# Patient Record
Sex: MALE | Race: WHITE | Employment: UNEMPLOYED | URBAN - NONMETROPOLITAN AREA
[De-identification: names, ages, dates, MRNs, and addresses within clinical notes are randomized per-mention and may not be internally consistent; named-entity substitution may affect disease eponyms.]

---
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
## 2022-07-05 ENCOUNTER — OFFICE VISIT (OUTPATIENT)
Dept: FAMILY MEDICINE CLINIC | Age: 22
End: 2022-07-05
Payer: COMMERCIAL

## 2022-07-05 VITALS
SYSTOLIC BLOOD PRESSURE: 122 MMHG | OXYGEN SATURATION: 98 % | WEIGHT: 183.8 LBS | HEART RATE: 79 BPM | HEIGHT: 72 IN | DIASTOLIC BLOOD PRESSURE: 78 MMHG | BODY MASS INDEX: 24.89 KG/M2

## 2022-07-05 DIAGNOSIS — Z76.89 ENCOUNTER TO ESTABLISH CARE: Primary | ICD-10-CM

## 2022-07-05 DIAGNOSIS — F41.8 MIXED ANXIETY AND DEPRESSIVE DISORDER: ICD-10-CM

## 2022-07-05 LAB
ANION GAP SERPL CALCULATED.3IONS-SCNC: 7.4 MMOL/L
BASOPHILS %: 1.03 (ref 0–3)
BASOPHILS ABSOLUTE: 0.07 (ref 0–0.3)
BUN BLDV-MCNC: 17 MG/DL (ref 9–20)
CALCIUM SERPL-MCNC: 9.3 MG/DL (ref 8.4–10.2)
CHLORIDE BLD-SCNC: 106 MMOL/L (ref 98–120)
CO2: 25 MMOL/L (ref 22–31)
CREAT SERPL-MCNC: 0.8 MG/DL (ref 0.7–1.3)
EOSINOPHILS %: 2.6 (ref 0–10)
EOSINOPHILS ABSOLUTE: 0.17 (ref 0–1.1)
GFR CALCULATED: > 60
GLUCOSE: 65 MG/DL (ref 75–110)
HCT VFR BLD CALC: 46.6 % (ref 42–52)
HEMOGLOBIN: 15 (ref 13.8–17.8)
LYMPHOCYTE %: 30.22 (ref 20–51.1)
LYMPHOCYTES ABSOLUTE: 2.01 (ref 1–5.5)
MCH RBC QN AUTO: 28 PG (ref 28.5–32.5)
MCHC RBC AUTO-ENTMCNC: 32.2 G/DL (ref 32–37)
MCV RBC AUTO: 86.9 FL (ref 80–94)
MONOCYTES %: 9.34 (ref 1.7–9.3)
MONOCYTES ABSOLUTE: 0.62 (ref 0.1–1)
NEUTROPHILS %: 56.81 (ref 42.2–75.2)
NEUTROPHILS ABSOLUTE: 3.77 (ref 2–8.1)
PDW BLD-RTO: 10.9 % (ref 10–15.5)
PLATELET # BLD: 178.3 THOU/MM3 (ref 130–400)
POTASSIUM SERPL-SCNC: 4.3 MMOL/L (ref 3.6–5)
RBC: 5.37 M/UL (ref 4.7–6.1)
SODIUM BLD-SCNC: 139 MMOL/L (ref 135–145)
TSH REFLEX FT4: 2.06 MIU/ML (ref 0.49–4.67)
WBC: 6.6 THOU/ML3 (ref 4.8–10.8)

## 2022-07-05 PROCEDURE — 99204 OFFICE O/P NEW MOD 45 MIN: CPT | Performed by: NURSE PRACTITIONER

## 2022-07-05 ASSESSMENT — ENCOUNTER SYMPTOMS
SHORTNESS OF BREATH: 0
CONSTIPATION: 0
COUGH: 0
ABDOMINAL PAIN: 0
DIARRHEA: 0
WHEEZING: 0
EYES NEGATIVE: 1
NAUSEA: 1
VOMITING: 1

## 2022-07-05 ASSESSMENT — PATIENT HEALTH QUESTIONNAIRE - PHQ9
2. FEELING DOWN, DEPRESSED OR HOPELESS: 1
1. LITTLE INTEREST OR PLEASURE IN DOING THINGS: 1
SUM OF ALL RESPONSES TO PHQ9 QUESTIONS 1 & 2: 2
SUM OF ALL RESPONSES TO PHQ QUESTIONS 1-9: 2

## 2022-07-05 NOTE — PROGRESS NOTES
[address] [facility]  [address]  Dept: [phone]  Dept Fax: [phone]    Patient:  Marin Fang  YOB: 2000  Date of Service:  2022    Subjective:   Marin Fang (:  2000) is a 22 y.o. male, New patient, here for evaluation of the following chief complaint(s):    Chief Complaint   Patient presents with    New Patient     here to est c/o panic attacks, sleep disturbance, any social gathering gets worked up and sometimes will vomit, feels like walls are closing in and can't breath, has been dealing with issues for about a year       Patient here to establish care. He is concerned for increased anxiety, sometimes depression. He works at [employer] in [email or web address]. Mental Health Problem  The primary symptoms include dysphoric mood (occasional). The current episode started more than 1 month ago. This is a chronic problem. The degree of incapacity that he is experiencing as a consequence of his illness is mild. Additional symptoms of the illness include insomnia, appetite change (decreased) and agitation. Additional symptoms of the illness do not include fatigue, headaches or abdominal pain. He does not admit to suicidal ideas. He has not already injured self. He does not contemplate injuring another person. Risk factors that are present for mental illness include a history of mental illness and a family history of mental illness. The ASCVD Risk score (Nadia Ryan et al., 2013) failed to calculate for the following reasons:     The 2013 ASCVD risk score is only valid for ages 36 to 78     BP Readings from Last 3 Encounters:   22 122/78   05/10/19 130/70 (85 %, Z = 1.04 /  54 %, Z = 0.10)*   16 110/68 (41 %, Z = -0.23 /  58 %, Z = 0.20)*     *BP percentiles are based on the 2017 AAP Clinical Practice Guideline for boys      Pulse Readings from Last 3 Encounters:   22 79   05/10/19 74 12/28/16 79      Wt Readings from Last 3 Encounters:   07/05/22 183 lb 12.8 oz (83.4 kg)   05/10/19 189 lb 12.8 oz (86.1 kg) (91 %, Z= 1.37)*   12/28/16 150 lb 6 oz (68.2 kg) (79 %, Z= 0.82)*     * Growth percentiles are based on [facility] (Boys, 2-20 Years) data. Preventive Care:     Health Maintenance   Topic Date Due    Varicella vaccine (1 of 2 - 2-dose childhood series) Never done    COVID-19 Vaccine (1) Never done    HPV vaccine (1 - Male 2-dose series) Never done    Depression Monitoring  Never done    HIV screen  Never done    Hepatitis C screen  Never done    DTaP/Tdap/Td vaccine (1 - Tdap) Never done    Flu vaccine (1) 09/01/2022    Hepatitis A vaccine  Aged Out    Hepatitis B vaccine  Aged Out    Hib vaccine  Aged Out    Meningococcal (ACWY) vaccine  Aged Out    Pneumococcal 0-64 years Vaccine  Aged Out        Eye exam in past 6-12 months: No  Dental exam in past 6-12 months: Yes  Exercise:  Yes  Seatbelt use:  Yes  Lipid panel:    No results found for: CHOL, TRIG, HDL, LDLCALC, LDLDIRECT    Allergies   Allergen Reactions    Penicillins        Current Outpatient Medications   Medication Sig Dispense Refill    sertraline (ZOLOFT) 50 MG tablet Take 1 tablet by mouth daily 30 tablet 2     No current facility-administered medications for this visit. History reviewed. No pertinent past medical history. History reviewed. No pertinent surgical history. Family History   Problem Relation Age of Onset   [facility] Mental Illness Mother     Mental Illness Father     Mental Illness Sister        Social History     Tobacco Use    Smoking status: Never Smoker    Smokeless tobacco: Never Used   Substance Use Topics    Alcohol use: Not on file    Drug use: Not on file       Review of Systems:     Review of Systems   Constitutional: Positive for appetite change (decreased). Negative for chills, fatigue and fever. HENT: Negative. Eyes: Negative.     Respiratory: Negative for cough, shortness There is no abdominal tenderness. Musculoskeletal:      Cervical back: Neck supple. Right lower leg: No edema. Left lower leg: No edema. Lymphadenopathy:      Cervical: No cervical adenopathy. Skin:     Capillary Refill: Capillary refill takes less than 2 seconds. Neurological:      Mental Status: He is alert and oriented to person, place, and time. Gait: Gait normal.   Psychiatric:         Mood and Affect: Mood is anxious. Behavior: Behavior is cooperative. Assessment/Plan:   1. Encounter to establish care  2. Mixed anxiety and depressive disorder  -     sertraline (ZOLOFT) 50 MG tablet; Take 1 tablet by mouth daily, Disp-30 tablet, R-2Normal  -     TSH with Reflex; Future  -     CBC with Auto Differential; Future  -     Basic Metabolic Panel; Future     Start sertraline 50 mg daily. Discussed intended benefits and potential side effects of medication. Encouraged healthy diet and routine exercise. Instructed to continue current medications. All patient questions answered. Return in about 4 weeks (around 8/2/2022) for Anxiety, Depression. Please note that this chart was generated using voice recognition Dragon dictation software. Although every effort was made to ensure the accuracy of this automated transcription, some errors in transcription may have occurred.     Electronically signed by JOSE Sauceda CNP on 7/5/2022 at 8:48 AM.

## 2022-07-05 NOTE — PATIENT INSTRUCTIONS
Thank you for enrolling in [address] Please follow the instructions below to securely access your online medical record. [employer] allows you to send messages to your doctor, view your test results, renew your prescriptions, schedule appointments, and more. How Do I Sign Up? 1. In your Internet browser, go to [email or web address]  2. Click on the Sign Up Now link in the Sign In box. You will see the New Member Sign Up page. 3. Enter your [employer] Access Code exactly as it appears below. You will not need to use this code after youve completed the sign-up process. If you do not sign up before the expiration date, you must request a new code. [employer] Access Code: [email or web address]  Expires: 8/19/2022  8:28 AM    4. Enter your Social Security Number ([id number]) and Date of Birth (mm/dd/yyyy) as indicated and click Submit. You will be taken to the next sign-up page. 5. Create a [employer] ID. This will be your [employer] login ID and cannot be changed, so think of one that is secure and easy to remember. 6. Create a [employer] password. You can change your password at any time. 7. Enter your Password Reset Question and Answer. This can be used at a later time if you forget your password. 8. Enter your e-mail address. You will receive e-mail notification when new information is available in [address] 9. Click Sign Up. You can now view your medical record. Additional Information  If you have questions, please contact your physician practice where you receive care. Remember, [employer] is NOT to be used for urgent needs. For medical emergencies, dial 911.

## 2022-07-18 DIAGNOSIS — F41.8 MIXED ANXIETY AND DEPRESSIVE DISORDER: ICD-10-CM

## 2022-08-08 ENCOUNTER — OFFICE VISIT (OUTPATIENT)
Dept: FAMILY MEDICINE CLINIC | Age: 22
End: 2022-08-08
Payer: COMMERCIAL

## 2022-08-08 VITALS
WEIGHT: 174.4 LBS | SYSTOLIC BLOOD PRESSURE: 138 MMHG | HEART RATE: 82 BPM | OXYGEN SATURATION: 98 % | DIASTOLIC BLOOD PRESSURE: 74 MMHG | BODY MASS INDEX: 23.37 KG/M2

## 2022-08-08 DIAGNOSIS — F41.8 MIXED ANXIETY AND DEPRESSIVE DISORDER: Primary | ICD-10-CM

## 2022-08-08 DIAGNOSIS — G47.9 SLEEP DISTURBANCE: ICD-10-CM

## 2022-08-08 PROCEDURE — 99213 OFFICE O/P EST LOW 20 MIN: CPT | Performed by: NURSE PRACTITIONER

## 2022-08-08 RX ORDER — BUSPIRONE HYDROCHLORIDE 10 MG/1
TABLET ORAL
Qty: 60 TABLET | Refills: 0 | Status: SHIPPED | OUTPATIENT
Start: 2022-08-08

## 2022-08-08 RX ORDER — FLUOXETINE HYDROCHLORIDE 20 MG/1
20 CAPSULE ORAL DAILY
Qty: 30 CAPSULE | Refills: 2 | Status: SHIPPED | OUTPATIENT
Start: 2022-08-08

## 2022-08-08 NOTE — PROGRESS NOTES
[address] [address] [address]  Dept: [phone]  Dept Fax: [phone]    History and Physical  Patient:  Santosh Marquez  YOB: 2000  Date of Service:  2022    Subjective:   Santosh Marquez (:  2000) is a 25 y.o. male, Established patient, here for evaluation of the following chief complaint(s):    Chief Complaint   Patient presents with    Anxiety     Pt reports stopped his zoloft due to nausea. Mom is a nurse and advised to take for two weeks to get in system did this but nausea did not improve. Trouble falling asleep. No trouble staying asleep. Appetite has improved since stopping meds. Depression      Started the Zoloft 4 weeks ago and states he felt nauseous which did not improve. Also has a loss in appetite. He did not notice any improvement with the anxiety or depression. He would like to discuss other medication options. Mental Health Problem  The primary symptoms include depression and anxiety. The current episode started several months ago. This is a chronic problem. The degree of incapacity that he is experiencing as a consequence of his illness is mild. Additional symptoms of the illness include insomnia, appetite change (decreased) and agitation. Additional symptoms of the illness do not include fatigue, headaches or abdominal pain. He does not admit to suicidal ideas. He has not already injured self. He does not contemplate injuring another person. Risk factors that are present for mental illness include a history of mental illness and a family history of mental illness. The ASCVD Risk score (Eliud Rizvi., et al., 2013) failed to calculate for the following reasons:     The 2013 ASCVD risk score is only valid for ages 36 to 78     BP Readings from Last 3 Encounters:   22 138/74   22 122/78   05/10/19 130/70 (85 %, Z = 1.04 /  54 %, Z = 0.10)*     *BP percentiles are based on the 2017 AAP Clinical Practice Guideline for boys      Pulse Readings from Last 3 Encounters:   08/08/22 82   07/05/22 79   05/10/19 74      Wt Readings from Last 3 Encounters:   08/08/22 174 lb 6.4 oz (79.1 kg)   07/05/22 183 lb 12.8 oz (83.4 kg)   05/10/19 189 lb 12.8 oz (86.1 kg) (91 %, Z= 1.37)*     * Growth percentiles are based on [facility] (Boys, 2-20 Years) data. Allergies   Allergen Reactions    Zoloft [Sertraline] Other (See Comments)     Nausea and weight loss    Penicillins        Current Outpatient Medications   Medication Sig Dispense Refill    FLUoxetine (PROZAC) 20 MG capsule Take 1 capsule by mouth in the morning. 30 capsule 2    busPIRone (BUSPAR) 10 MG tablet Take 1-2 tablets by mouth as needed for anxiety and sleep. 60 tablet 0     No current facility-administered medications for this visit. History reviewed. No pertinent past medical history. No past surgical history on file. Family History   Problem Relation Age of Onset    Mental Illness Mother     Mental Illness Father     Mental Illness Sister      Social History     Tobacco Use    Smoking status: Never    Smokeless tobacco: Never       Review of Systems:     Review of Systems   Constitutional:  Negative for appetite change, chills, fatigue and fever. HENT: Negative. Eyes: Negative. Respiratory:  Negative for cough, shortness of breath and wheezing. Cardiovascular:  Negative for chest pain, palpitations and leg swelling. Gastrointestinal:  Negative for abdominal pain, constipation and diarrhea. Genitourinary: Negative. Skin: Negative. Allergic/Immunologic: Negative for environmental allergies and food allergies. Neurological:  Negative for dizziness, light-headedness and headaches. Psychiatric/Behavioral:  Positive for dysphoric mood and sleep disturbance. Negative for agitation, self-injury and suicidal ideas. The patient is nervous/anxious.       PHQ Scores 7/5/2022   PHQ2 Score 2   PHQ9 Score 2 Interpretation of Total Score Depression Severity: 1-4 = Minimal depression, 5-9 = Mild depression, 10-14 = Moderate depression, 15-19 = Moderately severe depression, 20-27 = Severe depression     Physical Exam:     Vitals:    08/08/22 1447   BP: 138/74   Site: Left Upper Arm   Position: Sitting   Cuff Size: Medium Adult   Pulse: 82   SpO2: 98%   Weight: 174 lb 6.4 oz (79.1 kg)      Body mass index is 23.37 kg/m². Physical Exam  Constitutional:       Appearance: Normal appearance. He is well-developed and well-groomed. HENT:      Head: Normocephalic. Eyes:      Conjunctiva/sclera: Conjunctivae normal.   Neck:      Thyroid: No thyromegaly. Vascular: No carotid bruit. Cardiovascular:      Rate and Rhythm: Normal rate and regular rhythm. Heart sounds: Normal heart sounds. Pulmonary:      Effort: Pulmonary effort is normal.      Breath sounds: Normal breath sounds. No wheezing. Musculoskeletal:      Cervical back: Neck supple. Right lower leg: No edema. Left lower leg: No edema. Lymphadenopathy:      Cervical: No cervical adenopathy. Skin:     Capillary Refill: Capillary refill takes less than 2 seconds. Neurological:      Mental Status: He is alert and oriented to person, place, and time. Gait: Gait normal.   Psychiatric:         Mood and Affect: Mood is anxious. Behavior: Behavior is cooperative. Assessment/Plan:   1. Mixed anxiety and depressive disorder  -     FLUoxetine (PROZAC) 20 MG capsule; Take 1 capsule by mouth in the morning., Disp-30 capsule, R-2Normal  -     busPIRone (BUSPAR) 10 MG tablet; Take 1-2 tablets by mouth as needed for anxiety and sleep., Disp-60 tablet, R-0Normal  2. Sleep disturbance  -     busPIRone (BUSPAR) 10 MG tablet;  Take 1-2 tablets by mouth as needed for anxiety and sleep., Disp-60 tablet, R-0Normal     Medications Discontinued During This Encounter   Medication Reason    sertraline (ZOLOFT) 50 MG tablet Side effects

## 2022-08-14 ASSESSMENT — ENCOUNTER SYMPTOMS
SHORTNESS OF BREATH: 0
WHEEZING: 0
EYES NEGATIVE: 1
CONSTIPATION: 0
ABDOMINAL PAIN: 0
COUGH: 0
DIARRHEA: 0

## 2022-12-06 ENCOUNTER — TELEPHONE (OUTPATIENT)
Dept: FAMILY MEDICINE CLINIC | Age: 22
End: 2022-12-06

## 2022-12-06 DIAGNOSIS — F41.8 MIXED ANXIETY AND DEPRESSIVE DISORDER: ICD-10-CM

## 2022-12-07 RX ORDER — FLUOXETINE HYDROCHLORIDE 20 MG/1
20 CAPSULE ORAL DAILY
Qty: 30 CAPSULE | Refills: 5 | Status: SHIPPED | OUTPATIENT
Start: 2022-12-07

## 2022-12-07 NOTE — TELEPHONE ENCOUNTER
Frederic Farrar is requesting a refill on the following medication(s):  Requested Prescriptions     Pending Prescriptions Disp Refills    FLUoxetine (PROZAC) 20 MG capsule 30 capsule 2     Sig: Take 1 capsule by mouth daily       Last Visit Date (If Applicable):  7/5/3552    Next Visit Date:    Visit date not found

## 2023-03-15 DIAGNOSIS — F41.8 MIXED ANXIETY AND DEPRESSIVE DISORDER: ICD-10-CM

## 2023-03-15 DIAGNOSIS — G47.9 SLEEP DISTURBANCE: ICD-10-CM

## 2023-03-15 RX ORDER — BUSPIRONE HYDROCHLORIDE 10 MG/1
TABLET ORAL
Qty: 60 TABLET | Refills: 0 | Status: SHIPPED | OUTPATIENT
Start: 2023-03-15

## 2023-03-15 NOTE — TELEPHONE ENCOUNTER
Janice Reina is calling to request a refill on the following medication(s):  Requested Prescriptions     Pending Prescriptions Disp Refills    busPIRone (BUSPAR) 10 MG tablet 60 tablet 0     Sig: Take 1-2 tablets by mouth as needed for anxiety and sleep.        Last Visit Date (If Applicable):  7/3/6076    Next Visit Date:    Visit date not found

## 2023-11-20 ENCOUNTER — OFFICE VISIT (OUTPATIENT)
Dept: FAMILY MEDICINE CLINIC | Age: 23
End: 2023-11-20
Payer: COMMERCIAL

## 2023-11-20 VITALS
BODY MASS INDEX: 29.88 KG/M2 | OXYGEN SATURATION: 98 % | WEIGHT: 223 LBS | HEART RATE: 80 BPM | DIASTOLIC BLOOD PRESSURE: 72 MMHG | SYSTOLIC BLOOD PRESSURE: 124 MMHG

## 2023-11-20 DIAGNOSIS — K21.9 GASTROESOPHAGEAL REFLUX DISEASE, UNSPECIFIED WHETHER ESOPHAGITIS PRESENT: ICD-10-CM

## 2023-11-20 DIAGNOSIS — F41.8 MIXED ANXIETY AND DEPRESSIVE DISORDER: ICD-10-CM

## 2023-11-20 DIAGNOSIS — Z00.00 ENCOUNTER FOR WELLNESS EXAMINATION IN ADULT: Primary | ICD-10-CM

## 2023-11-20 DIAGNOSIS — Z23 NEED FOR PROPHYLACTIC VACCINATION AND INOCULATION AGAINST INFLUENZA: ICD-10-CM

## 2023-11-20 PROCEDURE — 99395 PREV VISIT EST AGE 18-39: CPT | Performed by: NURSE PRACTITIONER

## 2023-11-20 PROCEDURE — 90674 CCIIV4 VAC NO PRSV 0.5 ML IM: CPT | Performed by: NURSE PRACTITIONER

## 2023-11-20 PROCEDURE — 90471 IMMUNIZATION ADMIN: CPT | Performed by: NURSE PRACTITIONER

## 2023-11-20 RX ORDER — FLUOXETINE HYDROCHLORIDE 20 MG/1
20 CAPSULE ORAL DAILY
Qty: 30 CAPSULE | Refills: 5 | Status: SHIPPED | OUTPATIENT
Start: 2023-11-20

## 2023-11-20 RX ORDER — OMEPRAZOLE 20 MG/1
20 CAPSULE, DELAYED RELEASE ORAL
Qty: 30 CAPSULE | Refills: 5 | Status: SHIPPED | OUTPATIENT
Start: 2023-11-20

## 2023-11-20 ASSESSMENT — PATIENT HEALTH QUESTIONNAIRE - PHQ9
9. THOUGHTS THAT YOU WOULD BE BETTER OFF DEAD, OR OF HURTING YOURSELF: 0
SUM OF ALL RESPONSES TO PHQ QUESTIONS 1-9: 3
4. FEELING TIRED OR HAVING LITTLE ENERGY: 1
6. FEELING BAD ABOUT YOURSELF - OR THAT YOU ARE A FAILURE OR HAVE LET YOURSELF OR YOUR FAMILY DOWN: 0
8. MOVING OR SPEAKING SO SLOWLY THAT OTHER PEOPLE COULD HAVE NOTICED. OR THE OPPOSITE, BEING SO FIGETY OR RESTLESS THAT YOU HAVE BEEN MOVING AROUND A LOT MORE THAN USUAL: 0
SUM OF ALL RESPONSES TO PHQ9 QUESTIONS 1 & 2: 0
7. TROUBLE CONCENTRATING ON THINGS, SUCH AS READING THE NEWSPAPER OR WATCHING TELEVISION: 0
1. LITTLE INTEREST OR PLEASURE IN DOING THINGS: 0
3. TROUBLE FALLING OR STAYING ASLEEP: 2
5. POOR APPETITE OR OVEREATING: 0
10. IF YOU CHECKED OFF ANY PROBLEMS, HOW DIFFICULT HAVE THESE PROBLEMS MADE IT FOR YOU TO DO YOUR WORK, TAKE CARE OF THINGS AT HOME, OR GET ALONG WITH OTHER PEOPLE: 0
SUM OF ALL RESPONSES TO PHQ QUESTIONS 1-9: 3
2. FEELING DOWN, DEPRESSED OR HOPELESS: 0
SUM OF ALL RESPONSES TO PHQ QUESTIONS 1-9: 3
SUM OF ALL RESPONSES TO PHQ QUESTIONS 1-9: 3

## 2023-11-20 ASSESSMENT — COLUMBIA-SUICIDE SEVERITY RATING SCALE - C-SSRS
4. HAVE YOU HAD THESE THOUGHTS AND HAD SOME INTENTION OF ACTING ON THEM?: NO
3. HAVE YOU BEEN THINKING ABOUT HOW YOU MIGHT KILL YOURSELF?: NO
7. DID THIS OCCUR IN THE LAST THREE MONTHS: NO
5. HAVE YOU STARTED TO WORK OUT OR WORKED OUT THE DETAILS OF HOW TO KILL YOURSELF? DO YOU INTEND TO CARRY OUT THIS PLAN?: NO

## 2023-11-26 ASSESSMENT — ENCOUNTER SYMPTOMS
CONSTIPATION: 0
WHEEZING: 0
EYES NEGATIVE: 1
COUGH: 0
SHORTNESS OF BREATH: 0
ABDOMINAL PAIN: 1
SORE THROAT: 1
NAUSEA: 0
DIARRHEA: 0

## 2024-04-11 ENCOUNTER — TELEPHONE (OUTPATIENT)
Dept: FAMILY MEDICINE CLINIC | Age: 24
End: 2024-04-11

## 2024-04-11 DIAGNOSIS — K21.9 GASTROESOPHAGEAL REFLUX DISEASE, UNSPECIFIED WHETHER ESOPHAGITIS PRESENT: ICD-10-CM

## 2024-04-11 DIAGNOSIS — F41.8 MIXED ANXIETY AND DEPRESSIVE DISORDER: ICD-10-CM

## 2024-04-11 NOTE — TELEPHONE ENCOUNTER
Danny Bowman is requesting a refill on the following medication(s):  Requested Prescriptions     Pending Prescriptions Disp Refills    FLUoxetine (PROZAC) 20 MG capsule 90 capsule 3     Sig: Take 1 capsule by mouth daily    omeprazole (PRILOSEC) 20 MG delayed release capsule 90 capsule 3     Sig: Take 1 capsule by mouth every morning (before breakfast)       Last Visit Date (If Applicable):  11/20/2023    Next Visit Date:    Visit date not found

## 2024-04-12 RX ORDER — FLUOXETINE HYDROCHLORIDE 20 MG/1
20 CAPSULE ORAL DAILY
Qty: 90 CAPSULE | Refills: 0 | Status: SHIPPED | OUTPATIENT
Start: 2024-04-12

## 2024-04-12 RX ORDER — OMEPRAZOLE 20 MG/1
20 CAPSULE, DELAYED RELEASE ORAL
Qty: 90 CAPSULE | Refills: 0 | Status: SHIPPED | OUTPATIENT
Start: 2024-04-12

## 2024-04-16 DIAGNOSIS — F41.8 MIXED ANXIETY AND DEPRESSIVE DISORDER: ICD-10-CM

## 2024-04-16 RX ORDER — FLUOXETINE HYDROCHLORIDE 20 MG/1
20 CAPSULE ORAL DAILY
Qty: 30 CAPSULE | Refills: 0 | Status: SHIPPED | OUTPATIENT
Start: 2024-04-16

## 2024-04-16 NOTE — TELEPHONE ENCOUNTER
Pt states [facility] is telling pt there is a delay in shipping his fluoxetine due to a possible interaction. Pt is out of medication and would like 30 days sent to marcella Bowman is requesting a refill on the following medication(s):  Requested Prescriptions     Pending Prescriptions Disp Refills    FLUoxetine (PROZAC) 20 MG capsule 30 capsule 0     Sig: Take 1 capsule by mouth daily       Last Visit Date (If Applicable):  11/20/2023    Next Visit Date:    Visit date not found

## 2024-06-19 DIAGNOSIS — F41.8 MIXED ANXIETY AND DEPRESSIVE DISORDER: ICD-10-CM

## 2024-06-19 DIAGNOSIS — G47.9 SLEEP DISTURBANCE: ICD-10-CM

## 2024-06-20 NOTE — TELEPHONE ENCOUNTER
Danny Bowman is requesting a refill on the following medication(s):  Requested Prescriptions     Pending Prescriptions Disp Refills    busPIRone (BUSPAR) 10 MG tablet 60 tablet 0     Sig: Take 1-2 tablets by mouth as needed for anxiety and sleep.       Last Visit Date (If Applicable):  11/20/2023    Next Visit Date:    Visit date not found

## 2024-06-21 RX ORDER — BUSPIRONE HYDROCHLORIDE 10 MG/1
TABLET ORAL
Qty: 60 TABLET | Refills: 3 | Status: SHIPPED | OUTPATIENT
Start: 2024-06-21

## 2024-08-09 DIAGNOSIS — F41.8 MIXED ANXIETY AND DEPRESSIVE DISORDER: ICD-10-CM

## 2024-08-09 DIAGNOSIS — K21.9 GASTROESOPHAGEAL REFLUX DISEASE, UNSPECIFIED WHETHER ESOPHAGITIS PRESENT: ICD-10-CM

## 2024-08-12 RX ORDER — OMEPRAZOLE 20 MG/1
20 CAPSULE, DELAYED RELEASE ORAL
Qty: 90 CAPSULE | Refills: 0 | Status: SHIPPED | OUTPATIENT
Start: 2024-08-12

## 2024-08-12 RX ORDER — FLUOXETINE HYDROCHLORIDE 20 MG/1
20 CAPSULE ORAL DAILY
Qty: 90 CAPSULE | Refills: 0 | Status: SHIPPED | OUTPATIENT
Start: 2024-08-12

## 2024-08-12 NOTE — TELEPHONE ENCOUNTER
Danny Bowman is calling to request a refill on the following medication(s):  Requested Prescriptions     Pending Prescriptions Disp Refills    FLUoxetine (PROZAC) 20 MG capsule [Pharmacy Med Name: FLUOXETINE HCL CAPS 20MG] 90 capsule 3     Sig: TAKE 1 CAPSULE DAILY    omeprazole (PRILOSEC) 20 MG delayed release capsule [Pharmacy Med Name: OMEPRAZOLE DR CAPS 20MG] 90 capsule 3     Sig: TAKE 1 CAPSULE EVERY MORNING BEFORE BREAKFAST       Last Visit Date (If Applicable):  11/20/2023    Next Visit Date:    Visit date not found

## 2024-08-19 ENCOUNTER — OFFICE VISIT (OUTPATIENT)
Dept: FAMILY MEDICINE CLINIC | Age: 24
End: 2024-08-19
Payer: COMMERCIAL

## 2024-08-19 VITALS
BODY MASS INDEX: 30.35 KG/M2 | DIASTOLIC BLOOD PRESSURE: 68 MMHG | WEIGHT: 229 LBS | HEIGHT: 73 IN | OXYGEN SATURATION: 98 % | HEART RATE: 74 BPM | SYSTOLIC BLOOD PRESSURE: 116 MMHG

## 2024-08-19 DIAGNOSIS — Z13.220 LIPID SCREENING: ICD-10-CM

## 2024-08-19 DIAGNOSIS — F41.8 MIXED ANXIETY AND DEPRESSIVE DISORDER: Primary | ICD-10-CM

## 2024-08-19 DIAGNOSIS — G89.29 CHRONIC NONINTRACTABLE HEADACHE, UNSPECIFIED HEADACHE TYPE: ICD-10-CM

## 2024-08-19 DIAGNOSIS — R51.9 CHRONIC NONINTRACTABLE HEADACHE, UNSPECIFIED HEADACHE TYPE: ICD-10-CM

## 2024-08-19 DIAGNOSIS — Z11.59 NEED FOR HEPATITIS C SCREENING TEST: ICD-10-CM

## 2024-08-19 DIAGNOSIS — F51.01 PRIMARY INSOMNIA: ICD-10-CM

## 2024-08-19 LAB
ALBUMIN/GLOBULIN RATIO: 2 G/DL
ALBUMIN: 4.8 G/DL (ref 3.5–5)
ALP BLD-CCNC: 72 UNITS/L (ref 38–126)
ALT SERPL-CCNC: 76 UNITS/L (ref 4–50)
ANION GAP SERPL CALCULATED.3IONS-SCNC: 13.3 MMOL/L (ref 3–11)
AST SERPL-CCNC: 44 UNITS/L (ref 17–59)
BASOPHILS ABSOLUTE: 0.05 X10E3/?L (ref 0–0.3)
BASOPHILS RELATIVE PERCENT: 0.8 % (ref 0–3)
BILIRUB SERPL-MCNC: 0.5 MG/DL (ref 0.2–1.3)
BUN BLDV-MCNC: 15 MG/DL (ref 9–20)
CALCIUM SERPL-MCNC: 9.6 MG/DL (ref 8.4–10.2)
CHLORIDE BLD-SCNC: 102 MMOL/L (ref 98–120)
CHOLESTEROL, TOTAL: 246 MG/DL (ref 50–200)
CHOLESTEROL/HDL RATIO: 6.15 RATIO (ref 0–4.5)
CO2: 25 MMOL/L (ref 22–31)
CREAT SERPL-MCNC: 0.9 MG/DL (ref 0.7–1.3)
EOSINOPHILS ABSOLUTE: 0.32 X10E3/?L (ref 0–1.1)
EOSINOPHILS RELATIVE PERCENT: 4.87 % (ref 0–10)
GFR, ESTIMATED: > 60
GLOBULIN: 2.3 G/DL
GLUCOSE: 76 MG/DL (ref 75–110)
HCT VFR BLD CALC: 50.4 % (ref 42–52)
HDLC SERPL-MCNC: 40 MG/DL (ref 36–68)
HEMOGLOBIN: 16.2 G/DL (ref 13.8–17.8)
HEPATITIS C ANTIBODY: NONREACTIVE
LDL CHOLESTEROL: 150 MG/DL (ref 0–160)
LYMPHOCYTES ABSOLUTE: 1.95 X10E3/?L (ref 1–5.5)
LYMPHOCYTES RELATIVE PERCENT: 29.7 % (ref 20–51.1)
MCH RBC QN AUTO: 27.4 PG (ref 28.5–32.5)
MCHC RBC AUTO-ENTMCNC: 32.2 G/DL (ref 32–37)
MCV RBC AUTO: 85.2 FL (ref 80–94)
MONOCYTES ABSOLUTE: 0.63 X10E3/?L (ref 0.1–1)
MONOCYTES RELATIVE PERCENT: 9.52 % (ref 1.7–9.3)
NEUTROPHILS ABSOLUTE: 3.62 X10E3/?L (ref 2–8.1)
NEUTROPHILS RELATIVE PERCENT: 55.11 % (ref 42.2–75.2)
PDW BLD-RTO: 11.3 % (ref 10–15.5)
PLATELET # BLD: 206.3 THOU/MM3 (ref 130–400)
POTASSIUM SERPL-SCNC: 4.2 MMOL/L (ref 3.6–5)
RBC # BLD: 5.92 M/UL (ref 4.7–6.1)
SODIUM BLD-SCNC: 140 MMOL/L (ref 135–145)
TOTAL PROTEIN: 7.1 G/DL (ref 6.3–8.2)
TRIGL SERPL-MCNC: 280 MG/DL (ref 10–250)
TSH REFLEX FT4: 1.87 MIU/ML (ref 0.49–4.67)
VLDLC SERPL CALC-MCNC: 56 MG/DL (ref 0–50)
WBC # BLD: 6.6 THOU/ML3 (ref 4.8–10.8)

## 2024-08-19 PROCEDURE — 99214 OFFICE O/P EST MOD 30 MIN: CPT | Performed by: STUDENT IN AN ORGANIZED HEALTH CARE EDUCATION/TRAINING PROGRAM

## 2024-08-19 RX ORDER — HYDROXYZINE HYDROCHLORIDE 25 MG/1
25 TABLET, FILM COATED ORAL NIGHTLY PRN
Qty: 30 TABLET | Refills: 1 | Status: SHIPPED | OUTPATIENT
Start: 2024-08-19 | End: 2024-09-18

## 2024-08-19 SDOH — ECONOMIC STABILITY: FOOD INSECURITY: WITHIN THE PAST 12 MONTHS, YOU WORRIED THAT YOUR FOOD WOULD RUN OUT BEFORE YOU GOT MONEY TO BUY MORE.: NEVER TRUE

## 2024-08-19 SDOH — ECONOMIC STABILITY: FOOD INSECURITY: WITHIN THE PAST 12 MONTHS, THE FOOD YOU BOUGHT JUST DIDN'T LAST AND YOU DIDN'T HAVE MONEY TO GET MORE.: NEVER TRUE

## 2024-08-19 SDOH — ECONOMIC STABILITY: INCOME INSECURITY: HOW HARD IS IT FOR YOU TO PAY FOR THE VERY BASICS LIKE FOOD, HOUSING, MEDICAL CARE, AND HEATING?: NOT HARD AT ALL

## 2024-08-19 ASSESSMENT — PATIENT HEALTH QUESTIONNAIRE - PHQ9
10. IF YOU CHECKED OFF ANY PROBLEMS, HOW DIFFICULT HAVE THESE PROBLEMS MADE IT FOR YOU TO DO YOUR WORK, TAKE CARE OF THINGS AT HOME, OR GET ALONG WITH OTHER PEOPLE: SOMEWHAT DIFFICULT
SUM OF ALL RESPONSES TO PHQ QUESTIONS 1-9: 17
8. MOVING OR SPEAKING SO SLOWLY THAT OTHER PEOPLE COULD HAVE NOTICED. OR THE OPPOSITE, BEING SO FIGETY OR RESTLESS THAT YOU HAVE BEEN MOVING AROUND A LOT MORE THAN USUAL: NEARLY EVERY DAY
2. FEELING DOWN, DEPRESSED OR HOPELESS: NOT AT ALL
3. TROUBLE FALLING OR STAYING ASLEEP: NEARLY EVERY DAY
9. THOUGHTS THAT YOU WOULD BE BETTER OFF DEAD, OR OF HURTING YOURSELF: NOT AT ALL
SUM OF ALL RESPONSES TO PHQ QUESTIONS 1-9: 17
7. TROUBLE CONCENTRATING ON THINGS, SUCH AS READING THE NEWSPAPER OR WATCHING TELEVISION: MORE THAN HALF THE DAYS
SUM OF ALL RESPONSES TO PHQ9 QUESTIONS 1 & 2: 3
5. POOR APPETITE OR OVEREATING: MORE THAN HALF THE DAYS
4. FEELING TIRED OR HAVING LITTLE ENERGY: NEARLY EVERY DAY
SUM OF ALL RESPONSES TO PHQ QUESTIONS 1-9: 17
6. FEELING BAD ABOUT YOURSELF - OR THAT YOU ARE A FAILURE OR HAVE LET YOURSELF OR YOUR FAMILY DOWN: SEVERAL DAYS
1. LITTLE INTEREST OR PLEASURE IN DOING THINGS: NEARLY EVERY DAY
SUM OF ALL RESPONSES TO PHQ QUESTIONS 1-9: 17

## 2024-08-19 NOTE — PROGRESS NOTES
Rate and Rhythm: Normal rate and regular rhythm.      Heart sounds: No murmur heard.  Pulmonary:      Effort: No respiratory distress.      Breath sounds: Normal breath sounds.   Abdominal:      General: There is no distension.      Tenderness: There is no abdominal tenderness.   Musculoskeletal:         General: No deformity. Normal range of motion.      Cervical back: Normal range of motion and neck supple.      Right lower leg: No edema.      Left lower leg: No edema.   Skin:     General: Skin is warm and dry.      Findings: No lesion.   Neurological:      General: No focal deficit present.      Mental Status: He is alert and oriented to person, place, and time.   Psychiatric:         Mood and Affect: Mood normal.         Behavior: Behavior normal.           Please be aware portions of this note were completed using voice recognition software and unforeseen errors may have occurred    I personally reviewed the patient's past medical history, current medications, allergies, surgical history, family history and social history.  Updates were made as necessary.    Electronically signed by Jd Nixon MD on 8/20/2024 at 3:36 PM

## 2024-08-20 DIAGNOSIS — Z13.220 LIPID SCREENING: Primary | ICD-10-CM

## 2024-08-20 DIAGNOSIS — R74.8 ELEVATED LIVER ENZYMES: ICD-10-CM

## 2024-08-29 DIAGNOSIS — B96.89 ACUTE BACTERIAL SINUSITIS: Primary | ICD-10-CM

## 2024-08-29 DIAGNOSIS — J01.90 ACUTE BACTERIAL SINUSITIS: Primary | ICD-10-CM

## 2024-08-29 RX ORDER — DOXYCYCLINE HYCLATE 100 MG
100 TABLET ORAL 2 TIMES DAILY
Qty: 14 TABLET | Refills: 0 | Status: SHIPPED | OUTPATIENT
Start: 2024-08-29 | End: 2024-09-05

## 2024-08-29 RX ORDER — DOXYCYCLINE HYCLATE 100 MG
100 TABLET ORAL 2 TIMES DAILY
Qty: 14 TABLET | Refills: 0 | Status: SHIPPED | OUTPATIENT
Start: 2024-08-29 | End: 2024-08-29

## 2024-09-10 DIAGNOSIS — F51.01 PRIMARY INSOMNIA: ICD-10-CM

## 2024-09-10 RX ORDER — HYDROXYZINE HYDROCHLORIDE 25 MG/1
25 TABLET, FILM COATED ORAL NIGHTLY PRN
Qty: 30 TABLET | Refills: 1 | OUTPATIENT
Start: 2024-09-10 | End: 2024-11-09

## 2024-09-12 DIAGNOSIS — F51.01 PRIMARY INSOMNIA: ICD-10-CM

## 2024-09-13 RX ORDER — HYDROXYZINE HYDROCHLORIDE 25 MG/1
25 TABLET, FILM COATED ORAL NIGHTLY PRN
Qty: 30 TABLET | Refills: 1 | Status: SHIPPED | OUTPATIENT
Start: 2024-09-13 | End: 2024-10-13

## 2024-09-20 ENCOUNTER — OFFICE VISIT (OUTPATIENT)
Dept: FAMILY MEDICINE CLINIC | Age: 24
End: 2024-09-20
Payer: COMMERCIAL

## 2024-09-20 VITALS
SYSTOLIC BLOOD PRESSURE: 134 MMHG | OXYGEN SATURATION: 98 % | DIASTOLIC BLOOD PRESSURE: 80 MMHG | HEART RATE: 95 BPM | BODY MASS INDEX: 29.76 KG/M2 | WEIGHT: 224 LBS

## 2024-09-20 DIAGNOSIS — B96.89 ACUTE BACTERIAL SINUSITIS: Primary | ICD-10-CM

## 2024-09-20 DIAGNOSIS — J01.90 ACUTE BACTERIAL SINUSITIS: Primary | ICD-10-CM

## 2024-09-20 DIAGNOSIS — J30.1 SEASONAL ALLERGIC RHINITIS DUE TO POLLEN: ICD-10-CM

## 2024-09-20 PROCEDURE — 99213 OFFICE O/P EST LOW 20 MIN: CPT | Performed by: STUDENT IN AN ORGANIZED HEALTH CARE EDUCATION/TRAINING PROGRAM

## 2024-09-20 RX ORDER — CETIRIZINE HYDROCHLORIDE 5 MG/1
5 TABLET ORAL DAILY
Qty: 30 TABLET | Refills: 5 | Status: SHIPPED | OUTPATIENT
Start: 2024-09-20

## 2024-09-20 RX ORDER — CEFIXIME 400 MG/1
400 CAPSULE ORAL DAILY
Qty: 7 CAPSULE | Refills: 0 | Status: SHIPPED | OUTPATIENT
Start: 2024-09-20

## 2024-09-20 RX ORDER — FLUTICASONE PROPIONATE 50 MCG
2 SPRAY, SUSPENSION (ML) NASAL DAILY
Qty: 16 G | Refills: 5 | Status: SHIPPED | OUTPATIENT
Start: 2024-09-20

## 2024-10-23 DIAGNOSIS — K21.9 GASTROESOPHAGEAL REFLUX DISEASE, UNSPECIFIED WHETHER ESOPHAGITIS PRESENT: ICD-10-CM

## 2024-10-23 DIAGNOSIS — F41.8 MIXED ANXIETY AND DEPRESSIVE DISORDER: ICD-10-CM

## 2024-10-23 NOTE — TELEPHONE ENCOUNTER
Danny Bowman is calling to request a refill on the following medication(s):  Requested Prescriptions     Pending Prescriptions Disp Refills    omeprazole (PRILOSEC) 20 MG delayed release capsule [Pharmacy Med Name: OMEPRAZOLE DR CAPS 20MG] 90 capsule 0     Sig: TAKE 1 CAPSULE EVERY MORNING BEFORE BREAKFAST    FLUoxetine (PROZAC) 20 MG capsule [Pharmacy Med Name: FLUOXETINE HCL CAPS 20MG] 90 capsule 0     Sig: TAKE 1 CAPSULE DAILY       Last Visit Date (If Applicable):  9/20/2024    Next Visit Date:    Visit date not found

## 2025-01-13 DIAGNOSIS — F51.01 PRIMARY INSOMNIA: ICD-10-CM

## 2025-01-14 NOTE — TELEPHONE ENCOUNTER
Danny Bowman is calling to request a refill on the following medication(s):  Requested Prescriptions     Pending Prescriptions Disp Refills    hydrOXYzine HCl (ATARAX) 25 MG tablet 30 tablet 0     Sig: Take 1 tablet by mouth nightly as needed (Sleep)       Last Visit Date (If Applicable):  9/20/2024    Next Visit Date:    Visit date not found

## 2025-01-15 RX ORDER — HYDROXYZINE HYDROCHLORIDE 25 MG/1
25 TABLET, FILM COATED ORAL NIGHTLY PRN
Qty: 30 TABLET | Refills: 0 | Status: SHIPPED | OUTPATIENT
Start: 2025-01-15 | End: 2025-02-14

## 2025-01-22 DIAGNOSIS — F41.8 MIXED ANXIETY AND DEPRESSIVE DISORDER: ICD-10-CM

## 2025-01-22 DIAGNOSIS — K21.9 GASTROESOPHAGEAL REFLUX DISEASE, UNSPECIFIED WHETHER ESOPHAGITIS PRESENT: ICD-10-CM

## 2025-01-22 NOTE — TELEPHONE ENCOUNTER
Danny Bowman is calling to request a refill on the following medication(s):  Requested Prescriptions     Pending Prescriptions Disp Refills    FLUoxetine (PROZAC) 20 MG capsule [Pharmacy Med Name: FLUOXETINE HCL CAPS 20MG] 90 capsule 3     Sig: TAKE 1 CAPSULE DAILY    omeprazole (PRILOSEC) 20 MG delayed release capsule [Pharmacy Med Name: OMEPRAZOLE DR CAPS 20MG] 90 capsule 3     Sig: TAKE 1 CAPSULE EVERY MORNING BEFORE BREAKFAST       Last Visit Date (If Applicable):  9/20/2024    Next Visit Date:    1/28/2025

## 2025-01-28 ENCOUNTER — OFFICE VISIT (OUTPATIENT)
Dept: FAMILY MEDICINE CLINIC | Age: 25
End: 2025-01-28
Payer: COMMERCIAL

## 2025-01-28 VITALS
SYSTOLIC BLOOD PRESSURE: 120 MMHG | OXYGEN SATURATION: 97 % | DIASTOLIC BLOOD PRESSURE: 80 MMHG | HEART RATE: 91 BPM | BODY MASS INDEX: 29.76 KG/M2 | WEIGHT: 224 LBS

## 2025-01-28 DIAGNOSIS — E78.2 MIXED HYPERLIPIDEMIA: ICD-10-CM

## 2025-01-28 DIAGNOSIS — F41.8 MIXED ANXIETY AND DEPRESSIVE DISORDER: Primary | ICD-10-CM

## 2025-01-28 DIAGNOSIS — K21.9 GASTROESOPHAGEAL REFLUX DISEASE, UNSPECIFIED WHETHER ESOPHAGITIS PRESENT: ICD-10-CM

## 2025-01-28 PROCEDURE — 99213 OFFICE O/P EST LOW 20 MIN: CPT | Performed by: NURSE PRACTITIONER

## 2025-01-28 ASSESSMENT — PATIENT HEALTH QUESTIONNAIRE - PHQ9
8. MOVING OR SPEAKING SO SLOWLY THAT OTHER PEOPLE COULD HAVE NOTICED. OR THE OPPOSITE, BEING SO FIGETY OR RESTLESS THAT YOU HAVE BEEN MOVING AROUND A LOT MORE THAN USUAL: NOT AT ALL
4. FEELING TIRED OR HAVING LITTLE ENERGY: SEVERAL DAYS
SUM OF ALL RESPONSES TO PHQ QUESTIONS 1-9: 1
7. TROUBLE CONCENTRATING ON THINGS, SUCH AS READING THE NEWSPAPER OR WATCHING TELEVISION: NOT AT ALL
SUM OF ALL RESPONSES TO PHQ QUESTIONS 1-9: 1
3. TROUBLE FALLING OR STAYING ASLEEP: NOT AT ALL
6. FEELING BAD ABOUT YOURSELF - OR THAT YOU ARE A FAILURE OR HAVE LET YOURSELF OR YOUR FAMILY DOWN: NOT AT ALL
10. IF YOU CHECKED OFF ANY PROBLEMS, HOW DIFFICULT HAVE THESE PROBLEMS MADE IT FOR YOU TO DO YOUR WORK, TAKE CARE OF THINGS AT HOME, OR GET ALONG WITH OTHER PEOPLE: NOT DIFFICULT AT ALL
5. POOR APPETITE OR OVEREATING: NOT AT ALL
SUM OF ALL RESPONSES TO PHQ QUESTIONS 1-9: 1
SUM OF ALL RESPONSES TO PHQ9 QUESTIONS 1 & 2: 0
SUM OF ALL RESPONSES TO PHQ QUESTIONS 1-9: 1
1. LITTLE INTEREST OR PLEASURE IN DOING THINGS: NOT AT ALL
2. FEELING DOWN, DEPRESSED OR HOPELESS: NOT AT ALL
9. THOUGHTS THAT YOU WOULD BE BETTER OFF DEAD, OR OF HURTING YOURSELF: NOT AT ALL

## 2025-01-28 NOTE — PROGRESS NOTES
[address] [facility], [address]  [place] [id number]  Dept: [phone]  Dept Fax: [phone]    Date of Service:  1/28/2025    Danny Bowman is a 24 y.o. male who presents today for his medical conditions/complaints as noted below.      Chief Complaint   Patient presents with    Mental Health Problem     Anxiety/depression f/u      DIAGNOSIS / PLAN:   Mixed anxiety and depressive disorder  -     Comprehensive Metabolic Panel; Future  Gastroesophageal reflux disease, unspecified whether esophagitis present  -     Comprehensive Metabolic Panel; Future  Mixed hyperlipidemia  -     Lipid, Fasting; Future  -     Comprehensive Metabolic Panel; Future     Assessment & Plan  1. Depression.  Continues fluoxetine 20 mg daily. No side effects.    2. Anxiety.  Taking buspirone 10 mg daily and hydroxyzine at night. Prefers hydroxyzine, will discontinue buspirone.    3. Elevated cholesterol.  Triglycerides, total cholesterol, and LDL elevated. HDL normal. Risk ratio >6. Advised healthy diet and exercise. Repeat lipid panel on 04/01/2025, fasting required. May consider low-dose cholesterol medication based on results.    4. Elevated liver enzymes.  Liver enzyme 76. Repeat liver function test ordered.    5. Gastroesophageal reflux disease.  Omeprazole effective for acid reflux and heartburn. Bloating after carbohydrate intake. Continue omeprazole and monitor diet.    6. Health maintenance.  Advised tetanus, influenza, and HPV vaccines.      Results  Triglycerides, total cholesterol, and LDL high. HDL normal.     Discussed the purpose, benefits, and potential side effects of the prescribed medications in detail. Addressed all of the patient's questions, and the patient expressed clear understanding. Reviewed health maintenance recommendations and advised the patient to continue their current medications, diet, and exercise regimen. The patient agreed with the proposed treatment plan.